# Patient Record
Sex: MALE | Race: WHITE | NOT HISPANIC OR LATINO | Employment: FULL TIME | URBAN - METROPOLITAN AREA
[De-identification: names, ages, dates, MRNs, and addresses within clinical notes are randomized per-mention and may not be internally consistent; named-entity substitution may affect disease eponyms.]

---

## 2024-01-25 ENCOUNTER — OFFICE VISIT (OUTPATIENT)
Dept: URGENT CARE | Facility: CLINIC | Age: 34
End: 2024-01-25
Payer: COMMERCIAL

## 2024-01-25 VITALS
WEIGHT: 190 LBS | HEART RATE: 83 BPM | RESPIRATION RATE: 20 BRPM | SYSTOLIC BLOOD PRESSURE: 132 MMHG | HEIGHT: 69 IN | TEMPERATURE: 98 F | DIASTOLIC BLOOD PRESSURE: 87 MMHG | OXYGEN SATURATION: 97 % | BODY MASS INDEX: 28.14 KG/M2

## 2024-01-25 DIAGNOSIS — H60.501 ACUTE OTITIS EXTERNA OF RIGHT EAR, UNSPECIFIED TYPE: Primary | ICD-10-CM

## 2024-01-25 PROCEDURE — 99203 OFFICE O/P NEW LOW 30 MIN: CPT | Mod: ,,, | Performed by: PHYSICIAN ASSISTANT

## 2024-01-25 RX ORDER — AMLODIPINE BESYLATE 10 MG/1
10 TABLET ORAL
COMMUNITY
Start: 2023-11-02

## 2024-01-25 RX ORDER — TELMISARTAN 80 MG/1
80 TABLET ORAL
COMMUNITY
Start: 2023-11-06

## 2024-01-25 RX ORDER — NEOMYCIN SULFATE, POLYMYXIN B SULFATE AND HYDROCORTISONE 10; 3.5; 1 MG/ML; MG/ML; [USP'U]/ML
4 SUSPENSION/ DROPS AURICULAR (OTIC) 4 TIMES DAILY
Qty: 10 ML | Refills: 0 | Status: SHIPPED | OUTPATIENT
Start: 2024-01-25 | End: 2024-02-01

## 2024-01-25 NOTE — PROGRESS NOTES
"Subjective:      Patient ID: Alonzo Galarza is a 34 y.o. male.    Vitals:  height is 5' 9" (1.753 m) and weight is 86.2 kg (190 lb). His temperature is 97.7 °F (36.5 °C). His blood pressure is 132/87 and his pulse is 83. His respiration is 20 and oxygen saturation is 97%.     Chief Complaint: Otalgia (Pt started having right ear pain, throbbing, pain radiating to the right side of top of head about 1 week ago. Pt states he and pain in the same are 1 month ago and it came back 1 week ago. )    HPI  male with right ear pain over the past week presents to urgent Care for initial evaluation.  Patient denies recent URI symptoms cough cold congestion or ear trauma.  Patient reports similar right ear discomfort 1 month ago noted after using cotton tip applicator improved in 2 days with rest.   Otalgia     Additional comments: Pt started having right ear pain, throbbing, pain   radiating to the right side of top of head about 1 week ago. Pt states he   and pain in the same are 1 month ago and it came back 1 week ago.       Otalgia   There is pain in the right ear. This is a recurrent problem. There has been no fever. Pertinent negatives include no coughing, ear discharge, headaches, hearing loss, neck pain, rhinorrhea or vomiting.       Constitution: Negative for chills and fever.   HENT:  Positive for ear pain. Negative for ear discharge, tinnitus, hearing loss, congestion, sinus pain, sinus pressure, trouble swallowing and voice change.    Neck: Negative for neck pain and neck stiffness.   Cardiovascular: Negative.    Respiratory:  Negative for cough.    Gastrointestinal:  Negative for nausea and vomiting.   Musculoskeletal: Negative.    Skin: Negative.  Negative for erythema.   Allergic/Immunologic: Negative.    Neurological:  Negative for dizziness and headaches.      Objective:     Physical Exam   Constitutional: He is oriented to person, place, and time. He appears well-developed. He is cooperative.  Non-toxic " appearance. He does not appear ill. No distress.      Comments:Awake alert pleasant ambulatory male     HENT:   Head: Normocephalic and atraumatic.   Ears:   Right Ear: Hearing, tympanic membrane, external ear and ear canal normal. There is swelling. No no drainage or tenderness. No foreign bodies. Tympanic membrane is not perforated, not erythematous and not bulging. No middle ear effusion. No decreased hearing is noted.   Left Ear: Hearing, tympanic membrane, external ear and ear canal normal. No no drainage, swelling or tenderness. No foreign bodies. Tympanic membrane is not perforated, not erythematous and not bulging.  No middle ear effusion. No decreased hearing is noted.   Nose: Nose normal. No mucosal edema, rhinorrhea, nasal deformity or congestion. No epistaxis. Right sinus exhibits no maxillary sinus tenderness and no frontal sinus tenderness. Left sinus exhibits no maxillary sinus tenderness and no frontal sinus tenderness.   Mouth/Throat: Uvula is midline, oropharynx is clear and moist and mucous membranes are normal. Mucous membranes are moist. No trismus in the jaw. Normal dentition. No uvula swelling. No oropharyngeal exudate, posterior oropharyngeal edema or posterior oropharyngeal erythema.   Eyes: Conjunctivae and lids are normal.   Neck: Trachea normal and phonation normal. Neck supple. No edema present. No erythema present. No neck rigidity present.   Cardiovascular: Normal rate, regular rhythm, normal heart sounds and normal pulses.   Pulmonary/Chest: Effort normal and breath sounds normal. No respiratory distress. He has no decreased breath sounds. He has no rhonchi.   Abdominal: Normal appearance.   Musculoskeletal: Normal range of motion.         General: Normal range of motion.   Neurological: no focal deficit. He is alert and oriented to person, place, and time. He displays no weakness. No cranial nerve deficit. He exhibits normal muscle tone.   Skin: Skin is warm, dry, intact, not  "diaphoretic, not pale and no rash. No erythema   Psychiatric: His speech is normal and behavior is normal. Judgment and thought content normal.   Nursing note and vitals reviewed.       Previous History      Review of patient's allergies indicates:  No Known Allergies    Past Medical History:   Diagnosis Date    Gout, unspecified     Hypertension      Current Outpatient Medications   Medication Instructions    amLODIPine (NORVASC) 10 mg, Oral    neomycin-polymyxin-hydrocortisone (CORTISPORIN) 3.5-10,000-1 mg/mL-unit/mL-% otic suspension 4 drops, Right Ear, 4 times daily    telmisartan (MICARDIS) 80 mg, Oral     Past Surgical History:   Procedure Laterality Date    VASECTOMY      WISDOM TOOTH EXTRACTION       Family History   Problem Relation Age of Onset    No Known Problems Mother     Hyperlipidemia Father     Hypertension Father     No Known Problems Sister     No Known Problems Brother        Social History     Tobacco Use    Smoking status: Some Days     Types: Cigars    Smokeless tobacco: Never   Substance Use Topics    Alcohol use: Yes    Drug use: Never        Physical Exam      Vital Signs Reviewed   /87   Pulse 83   Temp 97.7 °F (36.5 °C)   Resp 20   Ht 5' 9" (1.753 m)   Wt 86.2 kg (190 lb)   SpO2 97%   BMI 28.06 kg/m²        Procedures    Procedures     Labs   No results found for this or any previous visit.      Assessment:     1. Acute otitis externa of right ear, unspecified type        Plan:     Recommend Cortisporin drops to right ear canal 3-4 times daily to help reduce irritation and inflammation.  Recommend avoid water or cotton tip applicators to ear canal.  May alternate Tylenol and ibuprofen every 6-8 hours if needed for mild-to-moderate pain and inflammation.  Recommend follow-up with primary care physician or ear nose and throat specialist in 1-2 weeks for re-evaluation if not improving.  Acute otitis externa of right ear, unspecified type    Other orders  -     " neomycin-polymyxin-hydrocortisone (CORTISPORIN) 3.5-10,000-1 mg/mL-unit/mL-% otic suspension; Place 4 drops into the right ear 4 (four) times daily. for 7 days  Dispense: 10 mL; Refill: 0

## 2024-02-04 ENCOUNTER — OFFICE VISIT (OUTPATIENT)
Dept: URGENT CARE | Facility: CLINIC | Age: 34
End: 2024-02-04
Payer: COMMERCIAL

## 2024-02-04 VITALS
DIASTOLIC BLOOD PRESSURE: 83 MMHG | OXYGEN SATURATION: 99 % | HEART RATE: 83 BPM | HEIGHT: 69 IN | TEMPERATURE: 98 F | BODY MASS INDEX: 28.14 KG/M2 | SYSTOLIC BLOOD PRESSURE: 133 MMHG | WEIGHT: 190 LBS | RESPIRATION RATE: 20 BRPM

## 2024-02-04 DIAGNOSIS — M79.645 PAIN OF FINGER OF LEFT HAND: Primary | ICD-10-CM

## 2024-02-04 PROCEDURE — 99213 OFFICE O/P EST LOW 20 MIN: CPT | Mod: ,,, | Performed by: PHYSICIAN ASSISTANT

## 2024-02-04 RX ORDER — INDOMETHACIN 50 MG/1
50 CAPSULE ORAL
COMMUNITY
Start: 2023-08-09

## 2024-02-04 RX ORDER — HYDROCODONE BITARTRATE AND ACETAMINOPHEN 5; 325 MG/1; MG/1
1 TABLET ORAL EVERY 12 HOURS PRN
Qty: 6 TABLET | Refills: 0 | Status: SHIPPED | OUTPATIENT
Start: 2024-02-04 | End: 2024-02-07

## 2024-02-04 RX ORDER — PREDNISONE 10 MG/1
10 TABLET ORAL 2 TIMES DAILY
Qty: 10 TABLET | Refills: 0 | Status: SHIPPED | OUTPATIENT
Start: 2024-02-04 | End: 2024-02-09

## 2024-02-04 NOTE — PROGRESS NOTES
"Subjective:      Patient ID: Alonzo Galarza is a 34 y.o. male.    Vitals:  height is 5' 9" (1.753 m) and weight is 86.2 kg (190 lb). His temperature is 97.7 °F (36.5 °C). His blood pressure is 133/83 and his pulse is 83. His respiration is 20 and oxygen saturation is 99%.     Chief Complaint: Hand Pain (Pt jammed left, 1st finger into the ground when playing baseball Friday night. Pt has swelling and pain. )    HPI  right-hand dominant male playing softball game 2 days ago catching ball with left-hand in glove striking left thumb on ground having pain and swelling presents to urgent Care for initial evaluation.   Hand Pain     Additional comments: Pt jammed left, 1st finger into the ground when   playing baseball Friday night. Pt has swelling and pain.     Hand Pain   The incident occurred 2 days ago. [place] at the park. The injury mechanism was a direct blow. The pain is present in the left fingers. Pertinent negatives include no numbness. He has tried ice and NSAIDs for the symptoms.       Musculoskeletal:  Positive for trauma, joint pain, joint swelling and abnormal ROM of joint.   Skin:  Negative for erythema and bruising.   Neurological:  Negative for numbness and tingling.      Objective:     Physical Exam   Constitutional: He is oriented to person, place, and time.  Non-toxic appearance.      Comments:Awake alert pleasant ambulatory male   normal  HENT:   Head: Normocephalic.   Cardiovascular: Normal rate and normal pulses.   Musculoskeletal:         General: Swelling and tenderness present.      Left hand: Left thumb: Exhibits decreased ROM, swelling and tenderness.        Hands:    Neurological: no focal deficit. He is alert and oriented to person, place, and time. No sensory deficit.   Skin: Skin is not diaphoretic. No bruising and No erythema   Psychiatric: Mood normal.          Previous History      Review of patient's allergies indicates:  No Known Allergies    Past Medical History: " "  Diagnosis Date    Gout, unspecified     Hypertension      Current Outpatient Medications   Medication Instructions    amLODIPine (NORVASC) 10 mg, Oral    HYDROcodone-acetaminophen (NORCO) 5-325 mg per tablet 1 tablet, Oral, Every 12 hours PRN    indomethacin (INDOCIN) 50 mg, Oral    predniSONE (DELTASONE) 10 mg, Oral, 2 times daily    telmisartan (MICARDIS) 80 mg, Oral     Past Surgical History:   Procedure Laterality Date    VASECTOMY      WISDOM TOOTH EXTRACTION       Family History   Problem Relation Age of Onset    No Known Problems Mother     Hyperlipidemia Father     Hypertension Father     No Known Problems Sister     No Known Problems Brother        Social History     Tobacco Use    Smoking status: Some Days     Types: Cigars    Smokeless tobacco: Never   Substance Use Topics    Alcohol use: Yes     Comment: OCC    Drug use: Never        Physical Exam      Vital Signs Reviewed   /83   Pulse 83   Temp 97.7 °F (36.5 °C)   Resp 20   Ht 5' 9" (1.753 m)   Wt 86.2 kg (190 lb)   SpO2 99%   BMI 28.06 kg/m²        Procedures    Procedures     Labs   No results found for this or any previous visit.  XR HAND COMPLETE 3 VIEW LEFT  Order: [id number]  Status: Final result       Visible to patient: No (inaccessible in Patient Portal)       Next appt: None       Dx: Pain of finger of left hand    0 Result Notes  Details    Reading Physician Reading Date Result Priority   Reji Cruz MD  [phone] 2/4/2024 STAT     Narrative & Impression  EXAMINATION:  Left hand three views     CLINICAL HISTORY:  Pain, basketball injury     COMPARISON:  None     FINDINGS:  No fracture subluxation seen.  The joint spaces are maintained.  No erosive or proliferative changes.     Impression:     No acute osseous finding.        Electronically signed by: Reji Cruz MD  Date:                                            02/04/2024     Assessment:     1. Pain of finger of left hand        Plan:   Radiologist's x-ray " report thumb x-ray negative for fracture today.  High concern for acute thumb injury.  Alternate Tylenol and ibuprofen every 4-6 hours if needed for mild-to-moderate pain and inflammation.  May add prednisone oral steroid to help reduce pain and inflammation.  Hydrocodone sparingly lowest dose if needed for severe pain or rest.  Do not take narcotic pain medication drive or operate machinery.  Recommend follow-up with primary care physician or orthopedist in 1-2 weeks for re-evaluation if not improving.    Pain of finger of left hand  -     XR HAND COMPLETE 3 VIEW LEFT; Future; Expected date: 02/04/2024    Other orders  -     predniSONE (DELTASONE) 10 MG tablet; Take 1 tablet (10 mg total) by mouth 2 (two) times daily. for 5 days  Dispense: 10 tablet; Refill: 0  -     HYDROcodone-acetaminophen (NORCO) 5-325 mg per tablet; Take 1 tablet by mouth every 12 (twelve) hours as needed for Pain.  Dispense: 6 tablet; Refill: 0

## 2024-02-04 NOTE — PATIENT INSTRUCTIONS
Radiologist's x-ray report thumb x-ray negative for fracture today.  High concern for acute thumb injury.  Alternate Tylenol and ibuprofen every 4-6 hours if needed for mild-to-moderate pain and inflammation.  May add prednisone oral steroid to help reduce pain and inflammation.  Hydrocodone sparingly lowest dose if needed for severe pain or rest.  Do not take narcotic pain medication drive or operate machinery.  Recommend follow-up with primary care physician or orthopedist in 1-2 weeks for re-evaluation if not improving.

## 2024-10-24 ENCOUNTER — OFFICE VISIT (OUTPATIENT)
Dept: URGENT CARE | Facility: CLINIC | Age: 34
End: 2024-10-24
Payer: COMMERCIAL

## 2024-10-24 VITALS
BODY MASS INDEX: 28.14 KG/M2 | HEIGHT: 69 IN | WEIGHT: 190 LBS | SYSTOLIC BLOOD PRESSURE: 134 MMHG | RESPIRATION RATE: 18 BRPM | HEART RATE: 75 BPM | TEMPERATURE: 98 F | DIASTOLIC BLOOD PRESSURE: 88 MMHG | OXYGEN SATURATION: 97 %

## 2024-10-24 DIAGNOSIS — M25.562 CHRONIC PAIN OF LEFT KNEE: Primary | ICD-10-CM

## 2024-10-24 DIAGNOSIS — G89.29 CHRONIC PAIN OF LEFT KNEE: Primary | ICD-10-CM

## 2024-10-24 PROCEDURE — 99214 OFFICE O/P EST MOD 30 MIN: CPT | Mod: ,,, | Performed by: FAMILY MEDICINE

## 2024-10-24 NOTE — PATIENT INSTRUCTIONS
We will send x-ray report to your primary care when available  Try heat or ice, apply for 10-20 minutes at a time several times a day. Put a thin cloth between the heat or ice and your skin.  Gently rub or massage the area to relieve pain and help with blood flow.  Do not do anything that makes the pain worse.   Seek immediate medical care if you develop numbness, tingling, unable to move extremities.  You have new or worsening pain or weakness or if you are not getting better as expected.

## 2024-10-24 NOTE — PROGRESS NOTES
"Subjective:      Patient ID: Alonzo Galarza is a 34 y.o. male.    Vitals:  height is 5' 9" (1.753 m) and weight is 86.2 kg (190 lb). His temperature is 97.5 °F (36.4 °C). His blood pressure is 134/88 and his pulse is 75. His respiration is 18 and oxygen saturation is 97%.     Chief Complaint: Knee Pain    34 y.o. male presents to urgent care with complaints of L knee pain that worsens with running and jumping x1yr. Reports one year ago he fell on his knee while playing softball and has been experiencing pain since. Alleviating factors used include OTC medication with no improvement. Denies swelling, redness, bruising, warmth, radiating pain, numbness, tingling or weakness, limited ROM.  His knee pain is being managed by his primary care, who put in a order for a knee x-ray which is why he is here.  He says his insurance is requiring an x-ray before approval for MRI.     Knee Pain     Constitution: Negative for chills, sweating, fatigue and fever.   HENT: Negative.     Neck: neck negative.   Cardiovascular: Negative.    Eyes: Negative.    Respiratory: Negative.     Gastrointestinal: Negative.    Endocrine: negative.   Genitourinary: Negative.    Musculoskeletal:  Positive for pain, joint pain (Knee) and pain with walking.   Skin: Negative.  Negative for erythema.   Allergic/Immunologic: Negative.    Neurological: Negative.  Negative for disorientation and altered mental status.   Hematologic/Lymphatic: Negative.    Psychiatric/Behavioral:  Negative for altered mental status, disorientation and confusion.       Objective:     Physical Exam   Constitutional: He is oriented to person, place, and time. He appears well-developed.   HENT:   Head: Normocephalic and atraumatic. Head is without abrasion, without contusion and without laceration.   Ears:   Right Ear: External ear normal.   Left Ear: External ear normal.   Nose: Nose normal.   Mouth/Throat: Oropharynx is clear and moist and mucous membranes are normal.   Eyes: " "Conjunctivae, EOM and lids are normal. Pupils are equal, round, and reactive to light.   Neck: Trachea normal and phonation normal. Neck supple.   Cardiovascular: Normal rate, regular rhythm and normal heart sounds.   Pulmonary/Chest: Effort normal and breath sounds normal. No stridor. No respiratory distress.   Musculoskeletal: Normal range of motion.         General: Normal range of motion.      Left knee: He exhibits normal range of motion, no swelling, no effusion, no deformity, normal alignment and no bony tenderness. No tenderness found.   Neurological: He is alert and oriented to person, place, and time.   Skin: Skin is warm, dry, intact and no rash. Capillary refill takes less than 2 seconds. No abrasion, No burn, No bruising, No erythema and No ecchymosis   Psychiatric: His speech is normal and behavior is normal. Judgment and thought content normal.   Nursing note and vitals reviewed.         Previous History      Review of patient's allergies indicates:  No Known Allergies    Past Medical History:   Diagnosis Date    Gout, unspecified     Hypertension      Current Outpatient Medications   Medication Instructions    amLODIPine (NORVASC) 10 mg    indomethacin (INDOCIN) 50 mg    telmisartan (MICARDIS) 80 mg     Past Surgical History:   Procedure Laterality Date    VASECTOMY      WISDOM TOOTH EXTRACTION       Family History   Problem Relation Name Age of Onset    No Known Problems Mother      Hyperlipidemia Father      Hypertension Father      No Known Problems Sister      No Known Problems Brother         Social History     Tobacco Use    Smoking status: Some Days     Types: Cigars    Smokeless tobacco: Never   Substance Use Topics    Alcohol use: Yes     Comment: OCC    Drug use: Never        Physical Exam      Vital Signs Reviewed   /88   Pulse 75   Temp 97.5 °F (36.4 °C)   Resp 18   Ht 5' 9" (1.753 m)   Wt 86.2 kg (190 lb)   SpO2 97%   BMI 28.06 kg/m²        Procedures  "   Procedures     Labs   No results found for this or any previous visit.   Assessment:     1. Chronic pain of left knee        Plan:   We will send x-ray report to your primary care when available  Try heat or ice, apply for 10-20 minutes at a time several times a day. Put a thin cloth between the heat or ice and your skin.  Gently rub or massage the area to relieve pain and help with blood flow.  Do not do anything that makes the pain worse.   Seek immediate medical care if you develop numbness, tingling, unable to move extremities.  You have new or worsening pain or weakness or if you are not getting better as expected.      Chronic pain of left knee  -     X-Ray Knee Complete 4 or More Views Left; Future; Expected date: 10/24/2024

## 2024-11-19 DIAGNOSIS — M25.562 LEFT KNEE PAIN: Primary | ICD-10-CM

## 2025-01-24 ENCOUNTER — OFFICE VISIT (OUTPATIENT)
Dept: ORTHOPEDICS | Facility: CLINIC | Age: 35
End: 2025-01-24
Payer: COMMERCIAL

## 2025-01-24 DIAGNOSIS — S83.32XA TEAR OF ARTICULAR CARTILAGE OF LEFT KNEE AS CURRENT INJURY, INITIAL ENCOUNTER: Primary | ICD-10-CM

## 2025-01-24 PROCEDURE — 1159F MED LIST DOCD IN RCRD: CPT | Mod: CPTII,,, | Performed by: ORTHOPAEDIC SURGERY

## 2025-01-24 PROCEDURE — 99203 OFFICE O/P NEW LOW 30 MIN: CPT | Mod: ,,, | Performed by: ORTHOPAEDIC SURGERY

## 2025-01-24 NOTE — PROGRESS NOTES
Subjective:    CC: Pain of the Left Knee (L knee pain. Started hurting about a year ago. Pain comes and goes if he's not using it actively. States it's more of a nagging pain. Has swelling at times. No numbness or tingling. No difficulty with mobility. Rarely takes advil or tylenol for it. No other concerns with it.)       HPI:  Patient comes in today for his 1st visit.  Patient complains of intermittent knee pain in his left knee for the last year and a half.  He did not have initial injury time a landing on his left side.  He felt a sharp stabbing pain at that time.  Patient states he continues to have pain any time he pushes his knee, does any type of strenuous activity.  He states it is a nagging type pain.  Sometimes swelling, no obvious instability.  He did have an MRI in October, we have discussed his results, we have reviewed his images today in length.    ROS: Refer to HPI for pertinent ROS. All other 12 point systems negative.    Objective:  There were no vitals filed for this visit.     Physical Exam:  The patient is well-nourished, well-developed and in no apparent distress, pleasant and cooperative. Examination of the left lower extremity compartments are soft and warm. Skin is intact. There are no signs or symptoms of DVT or infection. There is a small joint effusion. There is no erythema. Tender to palpation along the patellofemoral joint area and medial joint line , left knee range of motion is 0-115. The knee is stable to exam with varus and valgus stressing. Negative anterior and posterior drawer. Negative Lachman´s.  Questionable Juan Daniel's test. Patella grind is positive, Negative for apprehension. Neurovascularly intact distally.    Images:  Outside x-rays and outside MRI was reviewed. Images Reviewed and discussed with patient.    Assessment:  1. Tear of articular cartilage of left knee as current injury, initial encounter        Plan:  At this time we discussed his physical exam and outside  imaging.  We have discussed various treatment options including conservative treatment and surgical intervention.  We have discussed a knee arthroscopy debridement possible microfracture.  We have discussed the down time rehab process afterwards.  Risks benefits and alternatives discussed with the patient in detail.  All questions were answered.  Patient states he would like to wait until baseball season in his over, thinking about the summer, he will call when he is ready to proceed, he understands to call or return sooner if there is any new problems or difficulties.    Follow UP: No follow-ups on file.